# Patient Record
Sex: MALE | Race: WHITE | NOT HISPANIC OR LATINO | Employment: STUDENT | URBAN - METROPOLITAN AREA
[De-identification: names, ages, dates, MRNs, and addresses within clinical notes are randomized per-mention and may not be internally consistent; named-entity substitution may affect disease eponyms.]

---

## 2023-05-21 ENCOUNTER — HOSPITAL ENCOUNTER (EMERGENCY)
Facility: CLINIC | Age: 33
Discharge: HOME | End: 2023-05-22
Attending: FAMILY MEDICINE | Admitting: FAMILY MEDICINE
Payer: COMMERCIAL

## 2023-05-21 DIAGNOSIS — F10.929 ALCOHOLIC INTOXICATION WITH COMPLICATION (H): ICD-10-CM

## 2023-05-21 DIAGNOSIS — F32.A DEPRESSION, UNSPECIFIED DEPRESSION TYPE: ICD-10-CM

## 2023-05-21 LAB — ALCOHOL BREATH TEST: 0.17 (ref 0–0.01)

## 2023-05-21 PROCEDURE — 99285 EMERGENCY DEPT VISIT HI MDM: CPT | Mod: 25 | Performed by: FAMILY MEDICINE

## 2023-05-21 PROCEDURE — 82075 ASSAY OF BREATH ETHANOL: CPT | Performed by: FAMILY MEDICINE

## 2023-05-21 PROCEDURE — 90791 PSYCH DIAGNOSTIC EVALUATION: CPT

## 2023-05-21 PROCEDURE — 99284 EMERGENCY DEPT VISIT MOD MDM: CPT | Performed by: FAMILY MEDICINE

## 2023-05-21 ASSESSMENT — ACTIVITIES OF DAILY LIVING (ADL): ADLS_ACUITY_SCORE: 35

## 2023-05-22 VITALS
DIASTOLIC BLOOD PRESSURE: 111 MMHG | HEART RATE: 102 BPM | TEMPERATURE: 98.2 F | SYSTOLIC BLOOD PRESSURE: 176 MMHG | OXYGEN SATURATION: 99 % | RESPIRATION RATE: 18 BRPM

## 2023-05-22 ASSESSMENT — COLUMBIA-SUICIDE SEVERITY RATING SCALE - C-SSRS
1. IN THE PAST MONTH, HAVE YOU WISHED YOU WERE DEAD OR WISHED YOU COULD GO TO SLEEP AND NOT WAKE UP?: YES
4. HAVE YOU HAD THESE THOUGHTS AND HAD SOME INTENTION OF ACTING ON THEM?: NO
6. HAVE YOU EVER DONE ANYTHING, STARTED TO DO ANYTHING, OR PREPARED TO DO ANYTHING TO END YOUR LIFE?: NO
5. HAVE YOU STARTED TO WORK OUT OR WORKED OUT THE DETAILS OF HOW TO KILL YOURSELF? DO YOU INTEND TO CARRY OUT THIS PLAN?: NO
ATTEMPT LIFETIME: NO
TOTAL  NUMBER OF ABORTED OR SELF INTERRUPTED ATTEMPTS LIFETIME: NO
2. HAVE YOU ACTUALLY HAD ANY THOUGHTS OF KILLING YOURSELF?: YES
1. HAVE YOU WISHED YOU WERE DEAD OR WISHED YOU COULD GO TO SLEEP AND NOT WAKE UP?: YES
TOTAL  NUMBER OF INTERRUPTED ATTEMPTS LIFETIME: NO
2. HAVE YOU ACTUALLY HAD ANY THOUGHTS OF KILLING YOURSELF?: YES
4. HAVE YOU HAD THESE THOUGHTS AND HAD SOME INTENTION OF ACTING ON THEM?: NO
5. HAVE YOU STARTED TO WORK OUT OR WORKED OUT THE DETAILS OF HOW TO KILL YOURSELF? DO YOU INTEND TO CARRY OUT THIS PLAN?: NO
3. HAVE YOU BEEN THINKING ABOUT HOW YOU MIGHT KILL YOURSELF?: NO

## 2023-05-22 ASSESSMENT — ENCOUNTER SYMPTOMS: DYSPHORIC MOOD: 1

## 2023-05-22 ASSESSMENT — ACTIVITIES OF DAILY LIVING (ADL)
ADLS_ACUITY_SCORE: 35
ADLS_ACUITY_SCORE: 35

## 2023-05-22 NOTE — ED TRIAGE NOTES
Pt here via [facility] for suicidality concern. Reports being suicidal for a few months now. Tonight attempted to call his mom via phone, his phone didn't work, subsequently called 911. Denies plan. Told EMS wants to come in for additional help and resources.     Triage Assessment     Row Name 05/21/23 5832       Triage Assessment (Adult)    Airway WDL WDL       Respiratory WDL    Respiratory WDL WDL       Skin Circulation/Temperature WDL    Skin Circulation/Temperature WDL WDL       Cardiac WDL    Cardiac WDL WDL       Peripheral/Neurovascular WDL    Peripheral Neurovascular WDL WDL       Cognitive/Neuro/Behavioral WDL    Cognitive/Neuro/Behavioral WDL WDL

## 2023-05-22 NOTE — CONSULTS
"Diagnostic Evaluation Consultation  Crisis Assessment    Patient was assessed: Pat  Patient location: [facility] ED   Was a release of information signed: No. Reason: pt could not recall the name of his PCP.  He does not have other providers.       Referral Data and Chief Complaint  Butch Vega is a 33 year old male and presents to the ED via EMS. Patient is referred to the ED by self. Patient is presenting to the ED for the following concerns: suicidal thoughts.      Informed Consent and Assessment Methods     Patient is his own guardian. Writer met with patient and explained the crisis assessment process, including applicable information disclosures and limits to confidentiality, assessed understanding of the process, and obtained consent to proceed with the assessment. Patient was observed to be able to participate in the assessment as evidenced by verbal consent and patient was alert and oriented. Assessment methods included conducting a formal interview with patient, review of medical records, collaboration with medical staff, and obtaining relevant collateral information from family and community providers when available..     Over the course of this crisis assessment provided reassurance, offered validation, engaged patient in problem solving and disposition planning, worked with patient on safety and aftercare planning and provided psychoeducation. Patient's response to interventions was he was talkative, polite, and answered all assessment questions. He did repeat himself at times and talked somewhat slowly however could be easily understood.      Summary of Patient Situation  Pt reports that today he was drinking alcohol.  He called his mother and his phone broke. He had no way of contacting her and usually his mother helps him get through difficult times.  He decided to call 911, stating he would rather be \"safe than sorry\".  He reports EMS came, and he wanted to go to the hospital.  At the ED he " had a BAL of 0.17  Pt said if his phone would have not broke, he would have talked with his mother and likely would have been okay tonight.   Pt reports problems with anxiety, especially since he moved to his own home 2022. His mother who he is very close with, resides in [place].  He identified several supportive people that are listed on his safety plan.  He expressed a goal of finding hobbies and joining a gym.     Brief Psychosocial History  Pt resides alone, which he said is difficult. He resided with his mother all of his life until 2022 when he purchased a home. His mother resides in [place]. He has supportive people in his life including close friends and a positive work environment. He works full time and really likes his work and co-workers. He has been working at his current job for 6 months. His sister Delia is also supportive. His Yarsanism is Druze and he identifies his  as a supportive person.  He attends Nondenominational services weekly or biweekly. His father  2016 and he was very close with him. Pt reports drinking a lot of alcohol during that time and having more suicidal thoughts after his father's death.       Significant Clinical History     Pt worked with a counselor after his father's death. He was diagnosed with ADHD in middle school.  He was prescribed adderal at that time however stopped taking it due to side effects. He reports symptoms of anxiety since 2022, after moving to a new home alone.      Collateral Information  No collateral was obtained during the interview. Pt was a good  and has low risk of harm.      Risk Assessment  Lares Suicide Severity Rating Scale Full Clinical Version: 23  Suicidal Ideation  1. Wish to be Dead (Lifetime): Yes  1. Wish to be Dead (Past 1 Month): Yes  2. Non-Specific Active Suicidal Thoughts (Lifetime): Yes  2. Non-Specific Active Suicidal Thoughts (Past 1 Month): Yes  3. Active Suicidal Ideation with any  "Methods (Not Plan) Without Intent to Act (Lifetime): No  3. Active Suicidal Ideation with any Methods (Not Plan) Without Intent to Act (Past 1 Month): No  4. Active Suicidal Ideation with Some Intent to Act, Without Specific Plan (Lifetime): No  4. Active Suicidal Ideation with Some Intent to Act, Without Specific Plan (Past 1 Month): No  5. Active Suicidal Ideation with Specific Plan and Intent (Lifetime): No  5. Active Suicidal Ideation with Specific Plan and Intent (Past 1 Month): No  Intensity of Ideation  Most Severe Ideation Rating (Lifetime): 4  Most Severe Ideation Rating (Past 1 Month): 2  Frequency (Past 1 Month): Less than once a week  Controllability (Past 1 Month): Easily able to control thoughts (when drinking, it's less easy)  Deterrents (Past 1 Month): Deterrents definitely stopped you from attempting suicide  Reasons for Ideation (Past 1 Month):  (\"it's the alcohol that is talking\")  Suicidal Behavior  Actual Attempt (Lifetime): No  Has subject engaged in non-suicidal self-injurious behavior? (Lifetime): No  Interrupted Attempts (Lifetime): No  Aborted or Self-Interrupted Attempt (Lifetime): No  Preparatory Acts or Behavior (Lifetime): No  C-SSRS Risk (Lifetime/Recent)  Calculated C-SSRS Risk Score (Lifetime/Recent): Low Risk    Validity of evaluation is not impacted by presenting factors during interview. Pt appeared to be open regarding symptoms. However, some questions were not answered directly.  He did confirm he does not have SI currently.    Environmental or Psychosocial Events: geographic isolation from supports, other life stressors and ongoing abuse of substances  Chronic Risk Factors: none identified   Warning Signs: talking or writing about death, dying, or suicide and increasing substance use or abuse  Protective Factors: strong bond to family unit, community support, or employment, lives in a responsibly safe and stable environment, sense of importance of health and wellness, help " seeking, good problem-solving, coping, and conflict resolution skills, cultural, spiritual , or Worship beliefs associated with meaning and value in life, optimistic outlook - identification of future goals and constructive use of leisure time, enjoyable activities, resilience  Interpretation of Risk Scoring, Risk Mitigation Interventions and Safety Plan:  Pt presents to the ED due to suicidal ideation. He reports his phone broke so he was unable to call his mother, so he decided to call EMS for support.  He feels safe discharging with a safety plan and following up with his PCP. He declined a referral to therapy and prefers to have his mother help him with this. He reports several protective factors including his work and co-workers, friendships, mother, sister, Sabianist, , and AA.        Does the patient have thoughts of harming others? No     Is the patient engaging in sexually inappropriate behavior?  no        Current Substance Abuse     Is there recent substance abuse? Not using alcohol during weekdays, Fri and Sat or Sat or Sun, tries to limit self on nights before he has to work.  Reports using a lot of alcohol tonight No drug use      Was a urine drug screen or blood alcohol level obtained: Yes see Epic       Mental Status Exam     Affect: Blunted and normal   Appearance: Appropriate    Attention Span/Concentration: Attentive  Eye Contact: Variable   Fund of Knowledge: Appropriate    Language /Speech Content: Fluent   Language /Speech Volume: Normal    Language /Speech Rate/Productions: Normal    Recent Memory: Intact   Remote Memory: Intact   Mood: Depressed    Orientation to Person: Yes    Orientation to Place: Yes   Orientation to Time of Day: Yes    Orientation to Date: Yes    Situation (Do they understand why they are here?): Yes    Psychomotor Behavior: Normal    Thought Content: Clear   Thought Form: Intact , focused, repeated self      History of commitment: No        Medication    Psychotropic medications: No current medications but a history of adderal in middle school .       Medication changes made in the last two weeks: No       Current Care Team    Primary Care Provider: [facility] - Physical April 2022.    Psychiatrist: No  Therapist: No  : No  CTSS or ARMHS: No  ACT Team: No  Other: No      Diagnosis    311 (F32.9) Unspecified Depressive Disorder    300.00 (F41.9) Unspecified Anxiety Disorder - by history       Clinical Summary and Substantiation of Recommendations     Pt presents to the ED due to suicidal ideation. He reports his phone broke so he was unable to call his mother, so he decided to call EMS.   He feels safe discharging with a safety plan and following up with his PCP. He declined a referral to therapy and prefers to have his mother help him with this. He reports several protective factors including his work and co-workers, friendships, mother, sister, Zoroastrian, , and AA. He prefers to follow up with primary care for medication management.  He declined a referral to therapy and he prefers to have his mother help him with this. He was informed that he can call [facility] if he would like assistance scheduling appointments.      Disposition    Recommended disposition: Individual Therapy and Medication Management       Reviewed case and recommendations with attending provider. Attending Name: Dr. Servin       Attending concurs with disposition: Yes       Patient and/or validated legal guardian concurs with disposition: Yes       Final disposition: Medication management. Pt prefers to follow up with his PCP          Outpatient Details (if applicable):   Aftercare plan and appointments placed in the AVS and provided to patient: Yes. Given to patient by ED staff    Was lethal means counseling provided as a part of aftercare planning? Yes - describe pt does not have access to firearms.  Pt was advised to not have alcohol in his home and  remain sober.        Assessment Details    Patient interviewed 11:26pm-12:27am and 12:31am-12:46am     Total duration spent on the patient case in minutes: 1.50 hrs      CPT code(s) utilized: 87815 - Psychotherapy for Crisis - 60 (30-74*) min       DOMENICO Gan, Psychotherapist  [facility] - Triage & Transition Services  Callback: [phone]        Aftercare Plan  If I am feeling unsafe or I am in a crisis, I will:   Contact my established care providers   Call the National Suicide Prevention Lifeline: 988  Go to the nearest emergency room   Call 911     Warning signs that I or other people might notice when a crisis is developing for me: suicidal thoughts, difficulty residing alone     Things I am able to do on my own to cope or help me feel better: going for a walk, positive thinking, praying, exercise, listening to music, hearing water run is calming      Things that I am able to do with others to cope or help me better: calling someone, spending time with family and friends     Things I can use or do for distraction: walking, praying, exercise/gym, music, talk with friends and family    Changes I can make to support my mental health and wellness: gym membership      People in my life that I can ask for help: Mom, sister, John, Nash,  Father Russ Jennings, neighbors, AA group       Your [facility] has a mental health crisis team you can call 24/7: [facility] Crisis Response Team (CRT) [phone] or [phone]    Other things that are important when I'm in crisis: try to maintain sobriety, keeping liquor out of your house      Additional resources and information:     If you would like assistance scheduling a therapy appointment, please call behavioral healthcare providers at [phone].    Please follow up with your primary care provider.     Crisis Lines  Crisis Text Line  Text 553294  You will be connected with a trained live crisis counselor to provide support.    Por  "hakandavion, texto  LETICIA a 105338 o texto a 442-AYUDAME en WhatsApp    The [employer] (LGBTQ Youth Crisis Line)  9.469.640.9781  text START to 857-584    Community Resources  Fast Tracker  Linking people to mental health and substance use disorder resources  Herzion.Axilica     [place] Mental Health Warm Line  Peer to peer support  Monday thru Saturday, 12 pm to 10 pm  298.257.8187 or 4.089.278.5451  Text \"Support\" to 23458    National [place] on Mental Illness (HEIKE)  515.954.2799 or 1.888.HEIKE.HELPS    Mental Health Apps  My3  https://Yeti Data.org/    VirtualHopeBox  https://eeden/apps/virtual-hope-box/    Additional Information  Today you were seen by a licensed mental health professional through Triage and Transition services, Behavioral Healthcare Providers ([facility])  for a crisis assessment in the Emergency Department at [facility].  It is recommended that you follow up with your established providers (psychiatrist, mental health therapist, and/or primary care doctor - as relevant) as soon as possible. Coordinators from [facility] will be calling you in the next 24-48 hours to ensure that you have the resources you need.  You can also contact [facility] coordinators directly at [phone]. You may have been scheduled for or offered an appointment with a mental health provider. [facility] maintains an extensive network of licensed behavioral health providers to connect patients with the services they need.  We do not charge providers a fee to participate in our referral network.  We match patients with providers based on a patient's specific needs, insurance coverage, and location.  Our first effort will be to refer you to a provider within your care system, and will utilize providers outside your care system as needed.      Substance Use Disorder Direct Access Resources    It is recommended that you abstain from all mood altering chemicals. Please contact the sober support hotline (413-539-4605) as " needed; phones are answered 24 hours a day, 7 days a week.    To access substance use treatment you must have a comprehensive assessment completed to begin any treatment program.     If uninsured, please contact your county of residence for eligibility screen to substance use disorder evaluation and treatment:    [place] - [phone]   [place] - [phone]   [place] - [phone]   [place] - [phone]   [place] - [phone]   [place] - [phone]   [facility] [phone]   [place] - [phone]     If you have private insurance, call the customer service number on the back of your insurance card to find an in-network substance abuse use disorder assessment. The ideal provider will be a treatment facility, licensed in the [facility].        Ways to help cope with sobriety:    -- Take prescribed medicines as scheduled  -- Keep follow-up appointments  -- Talk to others about your concerns  -- Get regular exercise  -- Practice deep breathing skills  -- Eat a healthy diet  -- Use community resources, including hotline numbers, [facility] crisis and support meetings  -- Stay sober and avoid places/people/things associated with substance use  --Maintain a daily schedule/routine  --Get at least 7-8 hours of sleep per night  --Create a list 10--20 healthy activities that you can do that are enjoyable and do not involve substance use  --Create daily goals (approx. 1-4 goals) per day and work to achieve them throughout the day.     Free Resources:    [place] Recovery Connection ([facility])  [facility] connects people seeking recovery to resources that help foster and sustain long-term recovery. Whether you are seeking resources for treatment, transportation, housing, job training, education, health care or other pathways to recovery, [facility] is a great place to start.  Phone: 172.192.3641. www.[employer].org (Great listing of all types of recovery and non-recovery related resources)    Alcoholics Anonymous  Phone:  1-800-ALCOHOL  Website: HTTP://WWW.AA.ORG/  AA [place] (635-732-1003 or http://a[place].org)  AA [place] (068-677-6335 or www.aastpaul.org)     Narcotics Anonymous  Phone: 785.724.4661  Website: wwwunrival.    [employer] [address], [place], [place],  Phone: [phone]  Drop-in Hours: Monday-Friday 9-11:30 am. By appointment at other times.  Provides: [facility] is a drop-in center on the [place] side of [place] that provides a safe space for individuals who are homeless and have a history of chemical use. Sobriety is not a requirement but drugs and alcohol are not allowed on the property.  Services: Non-clients can access drop-in services such as Recovery and Harm Reduction Groups, referrals to case management, community activities, shower facilities, and a pool table. Individuals who are homeless and have chemical health needs may be eligible for enrollment into [facility]'s case management program. Clients and  work together to access benefits, treatment, health care, shelter, and external housing resources.

## 2023-05-22 NOTE — ED PROVIDER NOTES
History     Chief Complaint   Patient presents with     Suicidal     HPI  Butch Vega is a 33 year old male who presented to emergency room today from his home via ambulance secondary concerns of increased feelings of depression with some suicidal ideation.  Patient denies plan of self-harm in our does he have any plans of actually harming himself.  Admits that he started drinking about 7:00 tonight.  He states that he has had a history for significant alcohol use for several years.  He has been trying to cut down because of of his job but he states that he lives alone and so gets somewhat lonely any tends to turn to the bottle.  He states that he drank maybe 7 shots of liquor tonight.  Patient states that his phone  on him so he could not call his mom or his friends to talk through his thoughts of depression so he found his old phone and the only thing that it would allow him to call he is 911 so he called 911 and was brought to the ER.  He is interested in getting some help with both alcohol and the depression symptoms that he has had.  Patient states that he is otherwise healthy and is on no medications.    Allergies:  No Known Allergies    Problem List:    There are no problems to display for this patient.       Past Medical History:    No past medical history on file.    Past Surgical History:    No past surgical history on file.    Family History:    No family history on file.    Social History:  Marital Status:  Single [1]        Medications:    ADDERALL 20 MG OR TABS          Review of Systems   Psychiatric/Behavioral: Positive for dysphoric mood (Patient admits to feelings of depression that have been present for several years but he was unable to contact any of his confidence tonight and so called 911 instead to get some help.).   All other systems reviewed and are negative.      Physical Exam          Physical Exam  Vitals and nursing note reviewed. Exam conducted with a chaperone present  (One-to-one watch staff was present in the room during the exam.).   Constitutional:       Comments: Patient is very talkative and friendly and cooperative.  He appears to be intoxicated consistent with alcohol intoxication.   HENT:      Head: Atraumatic.      Nose: Nose normal.   Eyes:      General: No scleral icterus.  Cardiovascular:      Rate and Rhythm: Normal rate.      Pulses: Normal pulses.   Pulmonary:      Effort: Pulmonary effort is normal. No respiratory distress.   Musculoskeletal:         General: Normal range of motion.      Cervical back: Normal range of motion and neck supple.   Skin:     Capillary Refill: Capillary refill takes less than 2 seconds.      Findings: No rash.   Neurological:      Mental Status: He is alert and oriented to person, place, and time.   Psychiatric:         Mood and Affect: Mood is elated.         Speech: Speech is rapid and pressured.         Behavior: Behavior is cooperative.         Thought Content: Thought content is not paranoid or delusional. Thought content does not include homicidal ideation.      Comments: Patient appears intoxicated and is very talkative.  He is otherwise cooperative with exam.         ED Course     Mental Health Risk Assessment      PSS-3    Date and Time Over the past 2 weeks have you felt down, depressed, or hopeless? Over the past 2 weeks have you had thoughts of killing yourself? Have you ever attempted to kill yourself? When did this last happen? User   05/21/23 1645 yes yes no -- [facility]              Suicide assessment completed by mental health (D.E.C., LCSW, etc.)       Procedures              Critical Care time:  none               Results for orders placed or performed during the hospital encounter of 05/21/23 (from the past 24 hour(s))   Alcohol breath test POCT   Result Value Ref Range    Alcohol Breath Test 0.17 (A) 0.00 - 0.01           Assessments & Plan (with Medical Decision Making)  33-year-old male to the ER via ambulance from his  home today secondary concerns of thoughts of depression and problems with alcohol use/abuse.  Patient was monitored until sober enough to be able to evaluate clearly for his depression.  He was also evaluated by the DEC service.  He is felt to be safe to return to home at this time.  He was given information and options for treatment for his alcohol dependency as well as for depression.  I did send him home with instructions and asked him to read and follow the instructions and to return to the ER should he have any increase or worsening of his symptoms.     I have reviewed the nursing notes.    I have reviewed the findings, diagnosis, plan and need for follow up with the patient.           Medical Decision Making  The patient's presentation was of moderate complexity (an acute illness with systemic symptoms).    The patient's evaluation involved:  ordering and/or review of 1 test(s) in this encounter (see separate area of note for details)    The patient's management necessitated only low risk treatment.            Final diagnoses:   Alcoholic intoxication with complication (H)   Depression, unspecified depression type       5/21/2023   [facility] EMERGENCY DEPT     Mayank Servin,   05/22/23 3308

## 2023-05-22 NOTE — DISCHARGE INSTRUCTIONS
Aftercare Plan  If I am feeling unsafe or I am in a crisis, I will:   Contact my established care providers   Call the National Suicide Prevention Lifeline: 988  Go to the nearest emergency room   Call 911     Warning signs that I or other people might notice when a crisis is developing for me: suicidal thoughts, difficulty residing alone     Things I am able to do on my own to cope or help me feel better: going for a walk, positive thinking, praying, exercise, listening to music, hearing water run is calming      Things that I am able to do with others to cope or help me better: calling someone, spending time with family and friends     Things I can use or do for distraction: walking, praying, exercise/gym, music, talk with friends and family    Changes I can make to support my mental health and wellness: gym membership      People in my life that I can ask for help: Mom, sister, John, Nash,  Father Russ Jennings, neighbors, AA group       Your [facility] has a mental health crisis team you can call 24/7: [facility] Mobile Crisis Response Team (CRT) [phone] or [phone]    Other things that are important when I'm in crisis: try to maintain sobriety, keeping liquor out of your house      Additional resources and information:     If you would like assistance scheduling a therapy appointment, please call behavioral healthcare providers at [phone].    Please follow up with your primary care provider.     Crisis Lines  Crisis Text Line  Text 169517  You will be connected with a trained live crisis counselor to provide support.    Por espanol, texto  LETICIA a 666845 o texto a 442-AYUDAME en WhatsApp    [employer] (LGBTQ Youth Crisis Line)  7.104.351.3669  text START to 780-212    Community Resources  Fast Tracker  Linking people to mental health and substance use disorder resources  fastKaruna Pharmaceuticalsckermn.org     [place] Mental Health Warm Line  Peer to peer support  Monday  "thru Saturday, 12 pm to 10 pm  242.620.9984 or 1.148.252.2464  Text \"Support\" to 76350    National [place] [employer] Mental Illness (HEIKE)  132.881.7028 or 1.888.HEIKE.HELPS    Mental Health Apps  My3  https://Publicatepp.org/    VirtualHopeBox  https://Datezr/apps/virtual-hope-box/    Additional Information  Today you were seen by a licensed mental health professional through Triage and Transition services, [facility] ([facility])  for a crisis assessment in the Emergency Department at [facility].  It is recommended that you follow up with your established providers (psychiatrist, mental health therapist, and/or primary care doctor - as relevant) as soon as possible. Coordinators from [facility] will be calling you in the next 24-48 hours to ensure that you have the resources you need.  You can also contact [facility] coordinators directly at [phone]. You may have been scheduled for or offered an appointment with a mental health provider. [facility] maintains an extensive network of licensed behavioral health providers to connect patients with the services they need.  We do not charge providers a fee to participate in our referral network.  We match patients with providers based on a patient's specific needs, insurance coverage, and location.  Our first effort will be to refer you to a provider within your care system, and will utilize providers outside your care system as needed.      Substance Use Disorder Direct Access Resources    It is recommended that you abstain from all mood altering chemicals. Please contact the sober support hotline (657-515-8893) as needed; phones are answered 24 hours a day, 7 days a week.    To access substance use treatment you must have a comprehensive assessment completed to begin any treatment program.     If uninsured, please contact your county of residence for eligibility screen to substance use disorder evaluation and treatment:    [place] - [phone]   [place] - " [phone]   [facility] [phone]   [place]  [phone]   [place]  [phone]   [place]  [phone]   [facility] [phone]   [place] - [phone]     If you have private insurance, call the customer service number on the back of your insurance card to find an in-network substance abuse use disorder assessment. The ideal provider will be a treatment facility, licensed in the [facility].        Ways to help cope with sobriety:    -- Take prescribed medicines as scheduled  -- Keep follow-up appointments  -- Talk to others about your concerns  -- Get regular exercise  -- Practice deep breathing skills  -- Eat a healthy diet  -- Use community resources, including hotline numbers, [facility] crisis and support meetings  -- Stay sober and avoid places/people/things associated with substance use  --Maintain a daily schedule/routine  --Get at least 7-8 hours of sleep per night  --Create a list 10--20 healthy activities that you can do that are enjoyable and do not involve substance use  --Create daily goals (approx. 1-4 goals) per day and work to achieve them throughout the day.     Free Resources:    [facility] ([facility])  [facility] connects people seeking recovery to resources that help foster and sustain long-term recovery. Whether you are seeking resources for treatment, transportation, housing, job training, education, health care or other pathways to recovery, [facility] is a great place to start.  Phone: 270.498.3411. www.[place]i.SendtoNews (Great listing of all types of recovery and non-recovery related resources)    Alcoholics Anonymous  Phone: 0-712-ALCOHOL  Website: HTTP://WWW.AA.ORG/  [facility] [place] (793-294-4536 or http://a[place].org)  [facility] [place] (051-093-8722 or www.aastpaul.org)     Narcotics Anonymous  Phone: 851.204.7851  Website: www.Innolight.HealthID [employer]    [employer] [facility]  [address] [place] [address], [address], [place], [place],  Phone: [phone]  Drop-in Hours: Monday-Friday  9-11:30 am. By appointment at other times.  Provides: [facility] is a drop-in center on the [facility] side [facility] that provides a safe space for individuals who are homeless and have a history of chemical use. Sobriety is not a requirement but drugs and alcohol are not allowed on the property.  Services: Non-clients can access drop-in services such as Recovery and Harm Reduction Groups, referrals to case management, community activities, shower facilities, and a pool table. Individuals who are homeless and have chemical health needs may be eligible for enrollment into [facility]'s case management program. Clients and  work together to access benefits, treatment, health care, shelter, and external housing resources.

## 2023-05-22 NOTE — ED NOTES
Bed: ED03  Expected date:   Expected time:   Means of arrival:   Comments:  [facility]  32 y/o M  Suicidal